# Patient Record
(demographics unavailable — no encounter records)

---

## 2024-11-11 NOTE — HISTORY OF PRESENT ILLNESS
[FreeTextEntry1] : 86 F with PMH of HTN, HLD, PAD presents for f/u. Patient was having bilateral leg cramps worse distally since the beginning of this year. Went to see an orthopedics in [place] who performed an arterial duplex for her lower extremities which showed vascular disease. Therefore he recommended her to see a vascular cardiologist for possible angiography. Pt is s/p PTA of RSFA with stent and PTA with balloon angioplasty of R peroneal artery on 11/3/2022. Last visit she was taken off her aspirin due to anemia found on labs without a clear source. She is S/P GI eval with an unrevealing EGD as per patient, and a colonoscopy with one polyp removed. Found to have H. Pylori S/P eradication and confirmation this with GI. Currently denies any bleeding. Pt saw Heme onc noted to be iron deficient s/p iron infusions with an upcoming appt with Hematology. For f/u today over all doing well, reports no rest pain, c/o 'usual' b/l foot pain that is relived with walking    ====Data Reviewed Today========== 5/24 Le arterial duplex: Rt TPT 75%, disease of right PT, occluded left PTA   9/2023: Hgb 8.8, MCV 76.9 Iron serum 14, TIBC 398, & sat 3  8/2023, A1c 6.0%, creatine 0.7

## 2024-11-11 NOTE — ASSESSMENT
[FreeTextEntry1] : 84 F with PMH of hypertension presents for f/u known PAD  S/p PTA of R SFA with stent and PTA with balloon angioplasty of R peroneal artery Known left SFA disease - asymptomatic  Jaspreet Class II claudication HTN HLD Anemia CEAP 2 varicosities  5/24 Le arterial duplex: Rt TPT 75%, disease of right PT,  occluded left PTA    Plan - Continue risk factor mod. - Exercise therapy to continue  - Full labs  - Continue f/u with DPM

## 2024-11-11 NOTE — PHYSICAL EXAM
[General Appearance - Well Developed] : well developed [Normal Conjunctiva] : the conjunctiva exhibited no abnormalities [] : no respiratory distress [Auscultation Breath Sounds / Voice Sounds] : lungs were clear to auscultation bilaterally [Heart Rate And Rhythm] : heart rate and rhythm were normal [Heart Sounds] : normal S1 and S2 [Murmurs] : no murmurs present [2+] : left 2+ [No Pulse Discrepancy] : no pulse discrepancy detected [Bowel Sounds] : normal bowel sounds [Skin Turgor] : normal skin turgor [No Anxiety] : not feeling anxious [Normal Oral Mucosa] : normal oral mucosa [Nail Clubbing] : no clubbing of the fingernails [FreeTextEntry1] : CEAP 2 varicosities

## 2024-11-11 NOTE — REVIEW OF SYSTEMS
[Easy Bruising] : a tendency for easy bruising [SOB] : no shortness of breath [Dyspnea on exertion] : not dyspnea during exertion [Chest Discomfort] : no chest discomfort [Lower Ext Edema] : no extremity edema [Leg Claudication] : no intermittent leg claudication [Palpitations] : no palpitations [Orthopnea] : no orthopnea [PND] : no PND [Syncope] : no syncope [Easy Bleeding] : no tendency for easy bleeding [de-identified] : No open sores

## 2024-11-11 NOTE — REVIEW OF SYSTEMS
[Easy Bruising] : a tendency for easy bruising [SOB] : no shortness of breath [Dyspnea on exertion] : not dyspnea during exertion [Chest Discomfort] : no chest discomfort [Lower Ext Edema] : no extremity edema [Leg Claudication] : no intermittent leg claudication [Palpitations] : no palpitations [Orthopnea] : no orthopnea [PND] : no PND [Syncope] : no syncope [Easy Bleeding] : no tendency for easy bleeding [de-identified] : No open sores

## 2025-05-12 NOTE — REVIEW OF SYSTEMS
[Negative] : Genitourinary [SOB] : no shortness of breath [Dyspnea on exertion] : not dyspnea during exertion [Chest Discomfort] : no chest discomfort [Lower Ext Edema] : no extremity edema [Leg Claudication] : no intermittent leg claudication [Palpitations] : no palpitations [Orthopnea] : no orthopnea [PND] : no PND [Syncope] : no syncope [Cough] : no cough [Wheezing] : no wheezing [Abdominal Pain] : no abdominal pain [Joint Pain] : no joint pain

## 2025-05-12 NOTE — ASSESSMENT
[FreeTextEntry1] : 86 F with PMH of hypertension presents for f/u known PAD is here for f/u   S/p PTA of R SFA with stent and PTA with balloon angioplasty of R peroneal artery Known left SFA disease - asymptomatic [place]er Class II claudication HTN (/62) HLD CEAP 2 varicosities  Plan - Cw plavix and crestor 20 mg po od  - LDL 50, a1c 5.9  - check VA DUPLEX Artery LE + DAPHNIE with PVR - BP log chart at home  - refer to podiatry - f/u as needed     I, Dr. Engel, personally performed the evaluation and management (E/M) services for this established patient who presents today with (a) new problem(s)/exacerbation of (an) existing condition(s). That E/M includes conducting the clinically appropriate interval history &/or exam, assessing all new/exacerbated conditions, and establishing a new plan of care. Today, Resident/fellow and Shantelle Flaherty, was here to observe &/or participate in the visit & follow plan of care established by me.

## 2025-05-12 NOTE — HISTORY OF PRESENT ILLNESS
[FreeTextEntry1] : 86 F with PMH of HTN, HLD, PAD presents for f/u. Patient was having bilateral leg cramps worse distally since the beginning of this year. Went to see an orthopedics in [place] who performed an arterial duplex for her lower extremities which showed vascular disease. Therefore he recommended her to see a vascular cardiologist for possible angiography. Pt is s/p PTA of RSFA with stent and PTA with balloon angioplasty of R peroneal artery on 11/3/2022. Last visit she was taken off her aspirin due to anemia found on labs without a clear source. She is S/P right SFA stenting, + right PTA peroneal artery    ==== data reviewed today=== EKG VS Labs

## 2025-06-11 NOTE — PROCEDURE
[FreeTextEntry1] : 1) Right foot AND ankle x-rays 06/10/2025: +plantar and posterior calcaneal spur, +osteopenia, +mild midfoot and ankle arthritis, no evidence of fracture, no dislocation, joint spaces are maintained, no significant swelling, no soft tissue emphysema. Findings were shown and discussed with the patient fully and in detail. 2) Left foot AND ankle x-rays 06/10/2025: +plantar and posterior calcaneal spur, +osteopenia, +mild midfoot and ankle arthritis, no evidence of fracture, no dislocation, joint spaces are maintained, no significant swelling, no soft tissue emphysema. Findings were shown and discussed with the patient fully and in detail. 3) Onychomycosis with onychogryphosis and dystrophy: aseptic debridement of thick/elongated, incurvated, mycotic and dystrophic toenails with pain/discomfort x 6 done 06/10/2025  Patient tolerated all treatments well and without any complications

## 2025-06-11 NOTE — ASSESSMENT
[Verbal] : verbal [Patient] : patient [Verbalizes knowledge/Understanding] : Verbalizes knowledge/understanding [Good - alert, interested, motivated] : Good - alert, interested, motivated [Pt responsibility to plan of care] : patient responsibility to plan of care [FreeTextEntry1] : 1) Right foot AND ankle x-rays 06/10/2025: +plantar and posterior calcaneal spur, +osteopenia, +mild midfoot and ankle arthritis, no evidence of fracture, no dislocation, joint spaces are maintained, no significant swelling, no soft tissue emphysema. Findings were shown and discussed with the patient fully and in detail. 2) Left foot AND ankle x-rays 06/10/2025: +plantar and posterior calcaneal spur, +osteopenia, +mild midfoot and ankle arthritis, no evidence of fracture, no dislocation, joint spaces are maintained, no significant swelling, no soft tissue emphysema. Findings were shown and discussed with the patient fully and in detail. 3) Onychomycosis with onychogryphosis and dystrophy: aseptic debridement of thick/elongated, incurvated, mycotic and dystrophic toenails with pain/discomfort x 6 done 06/10/2025 4) Right and Left foot and ankle pain due to combination of osteopenia, mild osteoarthritis, PVD. Rx methylprednisolone 4mg PO BID, then weekly taper, Rx lidocaine 5% ointment prn, recommended use of supportive shoes with cushioned insoles 5) PVD to bilateral lower limbs: no ulceration or skin breakdown, atrophic skin changes, will continue to observe, f/u with Vascular physician, Dr Engel  Patient with Q8, class B and C findings of:   + decreased pedal hair growth + thickened toenail changes + skin discoloration + thin/shiny skin texture + skin rubor   + claudication + cool feet + edema - paresthesia - burning  Patient tolerated all treatments well and without any complications Follow up in 2 months

## 2025-06-11 NOTE — REASON FOR VISIT
[Initial Visit] : an initial visit for [FreeTextEntry2] : PVD, Right foot and ankle pain, Left foot and ankle pain, onychomycosis

## 2025-06-11 NOTE — PHYSICAL EXAM
[General Appearance - Alert] : alert [General Appearance - In No Acute Distress] : in no acute distress [] : normal voice and communication [0] : right foot posterior tibialis 0 [1+] : left foot posterior tibialis 1+ [2+] : left foot dorsalis pedis 2+ [Skin Turgor] : normal skin turgor [Diminished Throughout Left Foot] : diminished sensation with monofilament testing throughout left foot [Diminished Throughout Right Foot] : diminished sensation with monofilament testing throughout right foot [Affect] : the affect was normal [Impaired Insight] : insight and judgment were intact [Oriented To Time, Place, And Person] : oriented to person, place, and time [de-identified] : ROM of ankle, subtalar, midtarsal, MPJ, IPJ are adequate bilateral 5/5 muscle power in inversion, eversion, dorsiflexion, plantarflexion bilateral  [Foot Ulcer] : no foot ulcer [FreeTextEntry1] : gross epicritic sensation to feet diminished, light touch sensation diminished, sharp/dull sensation intact

## 2025-06-11 NOTE — HISTORY OF PRESENT ILLNESS
[FreeTextEntry1] : Ms. FABIO ERNST is a 86 year female who is seen in the office for PVD, Right foot and ankle pain, Left foot and ankle pain, onychomycosis. Patient denies any current or recent fever, chills, nausea, vomiting, SOB, or chest pain. AAOx3 and in NAD.

## 2025-06-11 NOTE — REVIEW OF SYSTEMS
[Arthralgias] : arthralgias [Joint Stiffness] : joint stiffness [Negative] : Constitutional [Limb Swelling] : no limb swelling [Joint Swelling] : no joint swelling [Skin Lesions] : no skin lesions [Skin Wound] : no skin wound [Itching] : no itching [Confused] : no confusion [Convulsions] : no convulsions [Dizziness] : no dizziness [Fainting] : no fainting